# Patient Record
Sex: FEMALE | Race: BLACK OR AFRICAN AMERICAN | NOT HISPANIC OR LATINO | URBAN - NONMETROPOLITAN AREA
[De-identification: names, ages, dates, MRNs, and addresses within clinical notes are randomized per-mention and may not be internally consistent; named-entity substitution may affect disease eponyms.]

---

## 2018-07-30 NOTE — PATIENT DISCUSSION
(H10.45) Other chronic allergic conjunctivitis - Assesment : BILATERAL PAPILLARY REACTION - Plan : Avoidance of potiental allergies. OTC Zaditor BID PRN and oral antihistamines.

## 2018-07-30 NOTE — PATIENT DISCUSSION
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 -SUPERIOR  NO HOLES, TEARS OR DETACHMENTS SEEN TODAY  DISCUSSED THINNING OF RETINA WITH PATIENT AND PATIENT'S MOTHER. - Plan : Monitor for changes. Advised patient to call our office with decreased vision or an increase in flashes and/or floaters.   2 YEAR EXAM

## 2018-07-30 NOTE — PATIENT DISCUSSION
(H52.221) Regular astigmatism, right eye - Assesment : Refractive testing reveals astigmatism. -MILD - Plan : OBSERVATION.  2 YEAR EXAM

## 2019-01-04 ENCOUNTER — IMPORTED ENCOUNTER (OUTPATIENT)
Dept: URBAN - NONMETROPOLITAN AREA CLINIC 1 | Facility: CLINIC | Age: 64
End: 2019-01-04

## 2019-01-04 PROBLEM — E11.9: Noted: 2019-01-04

## 2019-01-04 PROBLEM — Z01.818: Noted: 2019-01-04

## 2019-01-04 PROBLEM — I63.9: Noted: 2019-01-04

## 2019-01-04 PROBLEM — H25.12: Noted: 2019-01-18

## 2019-01-04 PROBLEM — D64.9: Noted: 2019-01-04

## 2019-01-04 PROBLEM — Z98.41: Noted: 2019-01-18

## 2019-01-04 PROBLEM — I10: Noted: 2019-01-04

## 2019-01-04 PROBLEM — F41.1: Noted: 2019-01-04

## 2019-01-04 PROBLEM — E78.5: Noted: 2019-01-04

## 2019-01-04 NOTE — PATIENT DISCUSSION
per  H & P patient is cleared for cataract surgery; 's Notes: MR 10/22/15 repeated 7/15/16<br />DFE  4/21/16<br />Optos 7/22/13 UTO 10/27/16<br />OCT - UTO<br />Gonio - UTO<br />VF- UTO <br />

## 2019-01-18 ENCOUNTER — IMPORTED ENCOUNTER (OUTPATIENT)
Dept: URBAN - NONMETROPOLITAN AREA CLINIC 1 | Facility: CLINIC | Age: 64
End: 2019-01-18

## 2019-01-18 PROCEDURE — 99024 POSTOP FOLLOW-UP VISIT: CPT

## 2019-01-18 NOTE — PATIENT DISCUSSION
3 Day POV CE OD 1/15/18 Standard- Discussed diagnosis in detail with patient - Patient is stable and doing well- Wound intact- Continue all post op drops as directed - Continue to monitor - RTC 1 week F/U POVCataract OS -Visually significant.-Cataract(s) causing symptomatic impairment of visual function not correctable with a tolerable change in glasses or contact lenses lighting or non-operative means resulting in specific activity limitations and/or participation restrictions including but not limited to reading viewing television driving or meeting vocational or recreational needs. -Expectation is clearer vision and reduced glare disability after cataract removal.-Refer to Dr Giulia Edward for cataract evaluation; 's Notes: MR 10/22/15 repeated 7/15/16DFE  4/21/16Optos 7/22/13 UTO 10/27/16OCT - Brayden - Prabha Saavedra- UTJOHANN

## 2019-01-29 ENCOUNTER — IMPORTED ENCOUNTER (OUTPATIENT)
Dept: URBAN - NONMETROPOLITAN AREA CLINIC 1 | Facility: CLINIC | Age: 64
End: 2019-01-29

## 2019-01-29 PROCEDURE — 99214 OFFICE O/P EST MOD 30 MIN: CPT

## 2019-01-29 NOTE — PATIENT DISCUSSION
Cataract(s)-Visually significant cataract OS . -Cataract(s) causing symptomatic impairment of visual function not correctable with a tolerable change in glasses or contact lenses lighting or non-operative means resulting in specific activity limitations and/or participation restrictions including but not limited to reading viewing television driving or meeting vocational or recreational needs. -Expectation is clearer vision and functional improvement in symptoms as well as reduced glare disability after cataract removal.-Recommend Standard/Traditional based on previous OPD testing and lifestyle questionnaire.-All questions were answered regarding surgery including pre and post-op medications appointments activity restrictions and anesthetic usage.-The risks benefits and alternatives and special risk factors for the patient were discussed in detail including but not limited to: bleeding infection retinal detachment vitreous loss problems with the implant and possible need for additional surgery.-Although rare the possibility of complete vision loss was discussed.-The need for glasses post-operatively was discussed.-Patient elects to proceed with cataract surgery OS . Will schedule at patient's convenience. s/p PCIOL-Pt doing well at 2 week s/p PCIOL. -Continue post-op gtts according to instruction sheet.-Okay to resume usual activites and d/c eye shield.; 's Notes: MR 10/22/15 repeated 7/15/16DFE  4/21/16Optos 7/22/13 UTO 10/27/16OCT - Brayden - ELZA- UTO

## 2019-01-30 PROBLEM — Z98.41: Noted: 2019-01-30

## 2019-01-30 PROBLEM — H25.12: Noted: 2019-01-30

## 2019-02-11 PROBLEM — F41.1: Noted: 2019-02-11

## 2019-02-11 PROBLEM — I10: Noted: 2019-02-11

## 2019-02-11 PROBLEM — I63.9: Noted: 2019-02-11

## 2019-02-11 PROBLEM — E78.5: Noted: 2019-02-11

## 2019-02-11 PROBLEM — Z01.818: Noted: 2019-02-11

## 2019-02-11 PROBLEM — E11.9: Noted: 2019-02-11

## 2019-02-15 ENCOUNTER — IMPORTED ENCOUNTER (OUTPATIENT)
Dept: URBAN - NONMETROPOLITAN AREA CLINIC 1 | Facility: CLINIC | Age: 64
End: 2019-02-15

## 2019-02-15 NOTE — PATIENT DISCUSSION
Per H&P patient has been cleared for cataract surgery; 's Notes: MR 10/22/15 repeated 7/15/16<br />DFE  4/21/16<br />Optos 7/22/13 UTO 10/27/16<br />OCT - UTO<br />Gonio - UTO<br />VF- UTO <br />

## 2019-04-12 ENCOUNTER — IMPORTED ENCOUNTER (OUTPATIENT)
Dept: URBAN - NONMETROPOLITAN AREA CLINIC 1 | Facility: CLINIC | Age: 64
End: 2019-04-12

## 2019-04-12 PROBLEM — Z98.41: Noted: 2019-04-12

## 2019-04-12 PROBLEM — Z98.42: Noted: 2019-04-12

## 2019-04-12 PROCEDURE — 99024 POSTOP FOLLOW-UP VISIT: CPT

## 2019-04-12 NOTE — PATIENT DISCUSSION
1 month POV CE OS 2/26/19 CE OD 1/15/19 Standard OU- MR deferred today by patient.  Recommend +2.50 OTC readers she is currently using a +3.00- Both intraocular lenses are stable and in place - Monitor for PCO progression - RTC in 3 months for DFE; 's Notes: MR 10/22/15 repeated 7/15/16DFE  4/21/16Optos 7/22/13 UTO 10/27/16OCT - UTOGonio - UTOVF- UTO

## 2020-01-16 ENCOUNTER — IMPORTED ENCOUNTER (OUTPATIENT)
Dept: URBAN - NONMETROPOLITAN AREA CLINIC 1 | Facility: CLINIC | Age: 65
End: 2020-01-16

## 2020-01-16 PROBLEM — Z96.1: Noted: 2020-01-16

## 2020-01-16 PROBLEM — H26.493: Noted: 2020-01-16

## 2020-01-16 PROCEDURE — 99213 OFFICE O/P EST LOW 20 MIN: CPT

## 2020-01-16 NOTE — PATIENT DISCUSSION
Pseudophakia OU with PCO OU - Discussed diagnosis in detail with patient - PCO OU noted but stable and no treatment needed at this time - Recommend +2.50 OTC readers - Continue to monitor - RTC 9 months complete; 's Notes: MR 10/22/15 repeated 7/15/16DFE  4/21/16Optos 7/22/13 UTO 10/27/16OCT - UTOGonio - UTOVF- UTO

## 2020-10-08 ENCOUNTER — IMPORTED ENCOUNTER (OUTPATIENT)
Dept: URBAN - NONMETROPOLITAN AREA CLINIC 1 | Facility: CLINIC | Age: 65
End: 2020-10-08

## 2020-10-08 PROBLEM — Z96.1: Noted: 2020-10-08

## 2020-10-08 PROBLEM — H52.4: Noted: 2020-10-08

## 2020-10-08 PROBLEM — H26.493: Noted: 2020-10-08

## 2020-10-08 PROCEDURE — S0621 ROUTINE OPHTHALMOLOGICAL EXA: HCPCS

## 2020-10-08 NOTE — PATIENT DISCUSSION
Pseudophakia OU with PCO OU - Discussed diagnosis in detail with patient - PCO OU noted but stable and no treatment needed at this time - Recommend +2.50 OTC readers - Continue to monitor Presbyopia OU - Discussed diagnosis in detail with patient - New glasses RX given today - Continue to monitor; 's Notes: MR 10/22/15 repeated 7/15/16DFE  4/21/16Optos 7/22/13 UTO 10/27/16OCT - Brayden - UTOVF- UTO

## 2021-11-17 ENCOUNTER — IMPORTED ENCOUNTER (OUTPATIENT)
Dept: URBAN - NONMETROPOLITAN AREA CLINIC 1 | Facility: CLINIC | Age: 66
End: 2021-11-17

## 2021-11-17 PROBLEM — Z96.1: Noted: 2021-11-17

## 2021-11-17 PROBLEM — H52.4: Noted: 2021-11-17

## 2021-11-17 PROBLEM — H26.493: Noted: 2021-11-17

## 2021-11-17 PROCEDURE — 99213 OFFICE O/P EST LOW 20 MIN: CPT

## 2022-04-15 ASSESSMENT — TONOMETRY
OS_IOP_MMHG: 13
OD_IOP_MMHG: 16
OS_IOP_MMHG: 15
OD_IOP_MMHG: 13
OD_IOP_MMHG: 12
OD_IOP_MMHG: 16
OS_IOP_MMHG: 14
OS_IOP_MMHG: 13
OS_IOP_MMHG: 15
OD_IOP_MMHG: 16
OD_IOP_MMHG: 14
OS_IOP_MMHG: 16

## 2022-04-15 ASSESSMENT — VISUAL ACUITY
OS_CC: 20/25-
OS_CC: 20/20-2
OD_CC: 20/25+2
OD_GLARE: 20/70
OS_GLARE: 20/70
OS_CC: 20/30-
OD_CC: 20/20-2
OD_CC: 20/25-
OS_AM: 20/25
OD_SC: 20/25-
OD_CC: 20/50-
OS_GLARE: 20/70
OS_CC: 20/40+2
OD_CC: 20/25-
OS_CC: 20/20-
OS_GLARE: 20/70
OS_SC: 20/25-

## 2022-04-15 ASSESSMENT — PACHYMETRY
OD_CT_UM: 552; ADJ: THIN
OD_CT_UM: 552; ADJ: THIN
OS_CT_UM: 582; ADJ: THICK
OD_CT_UM: 552; ADJ: THIN
OD_CT_UM: 552; ADJ: THIN
OS_CT_UM: 582; ADJ: THICK
OD_CT_UM: 552; ADJ: THIN
OD_CT_UM: 552; ADJ: THIN
OS_CT_UM: 582; ADJ: THICK
OD_CT_UM: 552; ADJ: THIN
OS_CT_UM: 582; ADJ: THICK
OD_CT_UM: 552; ADJ: THIN

## 2022-08-11 NOTE — PATIENT DISCUSSION
Minimal Symptoms of Retinal tear and detachment reviewed. Patient understands to call immediately with any such symptoms.

## 2023-08-03 NOTE — PATIENT DISCUSSION
OTC Zaditor BID PRN.
Recommended observation.
Stable.
Symptoms of Retinal tear and detachment reviewed. Patient understands to call immediately with any such symptoms.
Chronic moderate exacerbation    Hold metformin 1g BID  LDCS with diabetic diet  A1c and lipid panel in AM